# Patient Record
Sex: MALE | Race: WHITE | NOT HISPANIC OR LATINO | URBAN - METROPOLITAN AREA
[De-identification: names, ages, dates, MRNs, and addresses within clinical notes are randomized per-mention and may not be internally consistent; named-entity substitution may affect disease eponyms.]

---

## 2021-08-27 ENCOUNTER — APPOINTMENT (RX ONLY)
Dept: URBAN - METROPOLITAN AREA CLINIC 12 | Facility: CLINIC | Age: 71
Setting detail: DERMATOLOGY
End: 2021-08-27

## 2021-08-27 PROBLEM — C43.59 MALIGNANT MELANOMA OF OTHER PART OF TRUNK: Status: ACTIVE | Noted: 2021-08-27

## 2021-08-27 PROBLEM — C43.62 MALIGNANT MELANOMA OF LEFT UPPER LIMB, INCLUDING SHOULDER: Status: ACTIVE | Noted: 2021-08-27

## 2021-08-27 PROCEDURE — ? COUNSELING - MELANOMA

## 2021-08-27 PROCEDURE — 99203 OFFICE O/P NEW LOW 30 MIN: CPT

## 2021-08-27 PROCEDURE — ? PATIENT SPECIFIC COUNSELING

## 2021-08-27 PROCEDURE — ? PRE-OP WORKLIST

## 2021-08-27 NOTE — PROCEDURE: PRE-OP WORKLIST
Surgeon: Dr. Castillo
Surgery Scheduled: Left axillary reconstruction with local flap vs. stsg
Photos Taken?: yes
Detail Level: Simple
Coordination With:: DR. HAMILTON
Date Of Surgery: TBA
Date Of Evaluation: 08/27/2021

## 2021-08-27 NOTE — PROCEDURE: PATIENT SPECIFIC COUNSELING
Detail Level: Zone
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 The patient was referred by Dr. HAMILTON. The patient states that the lesion has been there for years, it had been previously injected years ago. Patient has Hx of BCC. 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 Patient is left handed. The patient states he is prone to systemic infection. Patient goes by [employer]. Patient has a skid steer tractor! \\n\\Matthew Castillo stepped in and evaluated the left axillary vault. He advised that the anticipated size of defect and location in the armpit is why the patient is here. He states that he could perform a skin graft from the thigh or that he could rotate extra skin in armpit region into a flap. He advised the patient will require 2-3 weeks of rest after the procedure. Dr. Castillo said the patient will receive an IV dose and oral tablets to fight infection. The patient will possibly start antibiotics a few days before the surgery and then continue them 7-14 days afterwards. He a,so recommended doing a Hibiclens wash in the armpit 3 days before surgery to knock down bacterial count. Patient verbalized understanding. Patient was pre-op Ed today.

## 2021-08-27 NOTE — HPI: MOHS RECONSTRUCTION SINGLE DEFECT EVALUATION
How Many Reconstruction Sites Are To Be Evaluated?: 1
Is This A New Presentation, Presentation For Surgery, Or A Follow-Up?: Mohs Reconstruction Consultation
Mohs Surgeon's Name: Dr. HAMILTON
What Is The Location Of This Site?: Left axilla

## 2021-09-14 ENCOUNTER — APPOINTMENT (RX ONLY)
Dept: URBAN - METROPOLITAN AREA CLINIC 12 | Facility: CLINIC | Age: 71
Setting detail: DERMATOLOGY
End: 2021-09-14

## 2021-09-14 DIAGNOSIS — Z48.89 ENCOUNTER FOR OTHER SPECIFIED SURGICAL AFTERCARE: ICD-10-CM

## 2021-09-14 PROCEDURE — ? PATIENT SPECIFIC COUNSELING

## 2021-09-14 PROCEDURE — ? POST-OP CHECK

## 2021-09-14 PROCEDURE — 97607 NEG PRS WND THR NDME<=50SQCM: CPT

## 2021-09-14 PROCEDURE — 99024 POSTOP FOLLOW-UP VISIT: CPT

## 2021-09-14 PROCEDURE — ? VAC THERAPY < 50 SQ CM

## 2021-09-14 PROCEDURE — ? COUNSELING - POST-OP CHECK, SKIN GRAFT

## 2021-09-14 ASSESSMENT — LOCATION SIMPLE DESCRIPTION DERM
LOCATION SIMPLE: LEFT AXILLARY VAULT
LOCATION SIMPLE: LEFT AXILLARY VAULT

## 2021-09-14 ASSESSMENT — LOCATION ZONE DERM
LOCATION ZONE: AXILLAE
LOCATION ZONE: AXILLAE

## 2021-09-14 ASSESSMENT — LOCATION DETAILED DESCRIPTION DERM
LOCATION DETAILED: LEFT AXILLARY VAULT
LOCATION DETAILED: LEFT AXILLARY VAULT

## 2021-09-14 NOTE — PROCEDURE: PATIENT SPECIFIC COUNSELING
Detail Level: Zone
Other (Free Text): Patient presents s/p wound reconstruction under left axilla ( skin graft) performed 9/9/21 patient reports doing well. No concerns today. \\n\\Matthew Castillo stepped in. Evaluated patient reassured that his skin graft looks great, 100% take. Recommended placing the wound vac back on for another coup,e of days to ensure the skin grafts continues to take. Advised patient to remove the film from his leg at home over the bath tub and start applying ointment and non stick gauze, changing it once a day.  Dr. Castillo replaced the wound vac today advised that he  return to the office Thursday to have the wound vac removed.

## 2021-09-14 NOTE — PROCEDURE: POST-OP CHECK
Detail Level: Detailed
Add Postop Global No-Charge Code (35015)?: yes
Wound Evaluated By: Dr. Castillo

## 2021-09-16 ENCOUNTER — APPOINTMENT (RX ONLY)
Dept: URBAN - METROPOLITAN AREA CLINIC 12 | Facility: CLINIC | Age: 71
Setting detail: DERMATOLOGY
End: 2021-09-16

## 2021-09-16 DIAGNOSIS — Z48.89 ENCOUNTER FOR OTHER SPECIFIED SURGICAL AFTERCARE: ICD-10-CM

## 2021-09-16 PROCEDURE — 97607 NEG PRS WND THR NDME<=50SQCM: CPT

## 2021-09-16 PROCEDURE — ? PATIENT SPECIFIC COUNSELING

## 2021-09-16 PROCEDURE — ? VAC THERAPY < 50 SQ CM

## 2021-09-16 PROCEDURE — ? POST-OP CHECK

## 2021-09-16 PROCEDURE — 99024 POSTOP FOLLOW-UP VISIT: CPT

## 2021-09-16 ASSESSMENT — LOCATION SIMPLE DESCRIPTION DERM
LOCATION SIMPLE: LEFT AXILLARY VAULT
LOCATION SIMPLE: LEFT AXILLARY VAULT

## 2021-09-16 ASSESSMENT — LOCATION DETAILED DESCRIPTION DERM
LOCATION DETAILED: LEFT AXILLARY VAULT
LOCATION DETAILED: LEFT AXILLARY VAULT

## 2021-09-16 ASSESSMENT — LOCATION ZONE DERM
LOCATION ZONE: AXILLAE
LOCATION ZONE: AXILLAE

## 2021-09-16 NOTE — PROCEDURE: PATIENT SPECIFIC COUNSELING
Detail Level: Zone
Other (Free Text): Patient presents s/p wound reconstruction under left axilla ( skin graft) performed 9/9/21 patient reports doing well. No concerns today. \\n\\nGraft looks great about 95% take. Recommended placing the wound vac back on to speed up the healing process.

## 2021-09-16 NOTE — PROCEDURE: POST-OP CHECK
Detail Level: Detailed
Add Postop Global No-Charge Code (10619)?: yes
Wound Evaluated By: Dr. Castillo

## 2021-09-21 ENCOUNTER — APPOINTMENT (RX ONLY)
Dept: URBAN - METROPOLITAN AREA CLINIC 12 | Facility: CLINIC | Age: 71
Setting detail: DERMATOLOGY
End: 2021-09-21

## 2021-09-21 DIAGNOSIS — Z48.89 ENCOUNTER FOR OTHER SPECIFIED SURGICAL AFTERCARE: ICD-10-CM

## 2021-09-21 PROCEDURE — ? PATIENT SPECIFIC COUNSELING

## 2021-09-21 PROCEDURE — ? STAPLE REMOVAL

## 2021-09-21 PROCEDURE — ? POST-OP CHECK

## 2021-09-21 PROCEDURE — ? COUNSELING - POST-OP CHECK, SKIN GRAFT

## 2021-09-21 PROCEDURE — 99024 POSTOP FOLLOW-UP VISIT: CPT

## 2021-09-21 ASSESSMENT — LOCATION DETAILED DESCRIPTION DERM
LOCATION DETAILED: LEFT AXILLARY VAULT
LOCATION DETAILED: LEFT AXILLARY VAULT

## 2021-09-21 ASSESSMENT — LOCATION SIMPLE DESCRIPTION DERM
LOCATION SIMPLE: LEFT AXILLARY VAULT
LOCATION SIMPLE: LEFT AXILLARY VAULT

## 2021-09-21 ASSESSMENT — LOCATION ZONE DERM
LOCATION ZONE: AXILLAE
LOCATION ZONE: AXILLAE

## 2021-09-21 NOTE — PROCEDURE: PATIENT SPECIFIC COUNSELING
Detail Level: Zone
Other (Free Text): Patient presents today with wife s/p wound reconstruction under left axilla ( skin graft) performed 9/9/21, patient voices no issues or concerns with healing, slight discomfort well, states he had a reaction to oral collagen supplements. Patient reports lymph nodes report came back negative. Dr. Castillo examined patient and reports skin graft is healthy, no signs of bleeding, or hematoma, graft has 95% take. Noted a small quarter opening on skin graft, recommended patient to allow more time to heal, advised patient to switch to stimulen powder twice a day AM/PM. Staples were removed today, advised patient may begin normal showers and activities. Patient to use antimicrobial soap to clean under left arm, dry area well and apply powder. Patient verbalized understanding and advised to follow up next Tuesday.

## 2021-09-21 NOTE — PROCEDURE: POST-OP CHECK
Detail Level: Detailed
Add Postop Global No-Charge Code (03575)?: yes
Wound Evaluated By: Dr. Castillo

## 2021-09-27 ENCOUNTER — APPOINTMENT (RX ONLY)
Dept: URBAN - METROPOLITAN AREA CLINIC 12 | Facility: CLINIC | Age: 71
Setting detail: DERMATOLOGY
End: 2021-09-27

## 2021-09-27 DIAGNOSIS — Z48.89 ENCOUNTER FOR OTHER SPECIFIED SURGICAL AFTERCARE: ICD-10-CM

## 2021-09-27 PROCEDURE — ? POST-OP CHECK

## 2021-09-27 PROCEDURE — 99024 POSTOP FOLLOW-UP VISIT: CPT

## 2021-09-27 PROCEDURE — ? COUNSELING - POST-OP CHECK, SKIN GRAFT

## 2021-09-27 PROCEDURE — ? PATIENT SPECIFIC COUNSELING

## 2021-09-27 ASSESSMENT — LOCATION ZONE DERM
LOCATION ZONE: AXILLAE
LOCATION ZONE: AXILLAE

## 2021-09-27 ASSESSMENT — LOCATION DETAILED DESCRIPTION DERM
LOCATION DETAILED: LEFT AXILLARY VAULT
LOCATION DETAILED: LEFT AXILLARY VAULT

## 2021-09-27 ASSESSMENT — LOCATION SIMPLE DESCRIPTION DERM
LOCATION SIMPLE: LEFT AXILLARY VAULT
LOCATION SIMPLE: LEFT AXILLARY VAULT

## 2021-09-27 NOTE — PROCEDURE: PATIENT SPECIFIC COUNSELING
Detail Level: Zone
Other (Free Text): Patient presents today with wife s/p wound reconstruction under left axilla ( skin graft) performed 9/9/21. Patient voices no issues or concerns with healing. Patient applies collagen powder 2x a day to the armpit. \\Matthew Castillo examined patient and reports skin grafts looking very good. He stated that the hole will close up in about a week or two. Collagen will help the skin heal. Advised the patient to wait until the area is fully pink before extending his range of motion. Patient should be able to drive himself to his next appointment. \\nPatient verbalized understanding and RtC in 2 weeks.

## 2021-09-27 NOTE — PROCEDURE: POST-OP CHECK
Detail Level: Detailed
Add Postop Global No-Charge Code (75488)?: yes
Wound Evaluated By: Dr. Castillo

## 2021-10-11 ENCOUNTER — APPOINTMENT (RX ONLY)
Dept: URBAN - METROPOLITAN AREA CLINIC 12 | Facility: CLINIC | Age: 71
Setting detail: DERMATOLOGY
End: 2021-10-11

## 2021-10-11 DIAGNOSIS — Z48.89 ENCOUNTER FOR OTHER SPECIFIED SURGICAL AFTERCARE: ICD-10-CM

## 2021-10-11 PROCEDURE — ? POST-OP CHECK

## 2021-10-11 PROCEDURE — 99024 POSTOP FOLLOW-UP VISIT: CPT

## 2021-10-11 PROCEDURE — ? PATIENT SPECIFIC COUNSELING

## 2021-10-11 PROCEDURE — ? COUNSELING - POST-OP CHECK, SKIN GRAFT

## 2021-10-11 ASSESSMENT — LOCATION SIMPLE DESCRIPTION DERM
LOCATION SIMPLE: LEFT AXILLARY VAULT
LOCATION SIMPLE: LEFT AXILLARY VAULT

## 2021-10-11 ASSESSMENT — LOCATION ZONE DERM
LOCATION ZONE: AXILLAE
LOCATION ZONE: AXILLAE

## 2021-10-11 ASSESSMENT — LOCATION DETAILED DESCRIPTION DERM
LOCATION DETAILED: LEFT AXILLARY VAULT
LOCATION DETAILED: LEFT AXILLARY VAULT

## 2021-10-11 NOTE — PROCEDURE: POST-OP CHECK
Detail Level: Detailed
Add Postop Global No-Charge Code (12772)?: yes
Wound Evaluated By: Dr. Castillo

## 2021-10-11 NOTE — PROCEDURE: PATIENT SPECIFIC COUNSELING
Detail Level: Zone
Other (Free Text): Patient presents today s/p wound reconstruction under left axilla ( skin graft) performed 9/9/21. Patient voices no issues or concerns with healing.\\Matthew Castillo examined patient and reports that it’s looking good. Dr. Castillo examined range of motion, he advised home physical therapy in which the patient should grab the top of a shelf/door with the left arm , lean into it and stretch it out. He stated that using a back massager in the armpit should help stretch it out. \\nPatient verbalized understanding and RtC in 2 months.